# Patient Record
Sex: MALE
[De-identification: names, ages, dates, MRNs, and addresses within clinical notes are randomized per-mention and may not be internally consistent; named-entity substitution may affect disease eponyms.]

---

## 2023-02-06 ENCOUNTER — APPOINTMENT (OUTPATIENT)
Dept: PODIATRY | Facility: CLINIC | Age: 28
End: 2023-02-06
Payer: COMMERCIAL

## 2023-02-06 VITALS — HEIGHT: 72 IN | WEIGHT: 235 LBS | BODY MASS INDEX: 31.83 KG/M2

## 2023-02-06 DIAGNOSIS — M25.572 PAIN IN LEFT ANKLE AND JOINTS OF LEFT FOOT: ICD-10-CM

## 2023-02-06 DIAGNOSIS — Z87.828 PERSONAL HISTORY OF OTHER (HEALED) PHYSICAL INJURY AND TRAUMA: ICD-10-CM

## 2023-02-06 DIAGNOSIS — Z78.9 OTHER SPECIFIED HEALTH STATUS: ICD-10-CM

## 2023-02-06 PROBLEM — Z00.00 ENCOUNTER FOR PREVENTIVE HEALTH EXAMINATION: Status: ACTIVE | Noted: 2023-02-06

## 2023-02-06 PROCEDURE — 99203 OFFICE O/P NEW LOW 30 MIN: CPT

## 2023-02-06 PROCEDURE — L4361: CPT | Mod: LT

## 2023-02-06 RX ORDER — MELOXICAM 15 MG/1
15 TABLET ORAL
Qty: 30 | Refills: 2 | Status: ACTIVE | COMMUNITY
Start: 2023-02-06 | End: 1900-01-01

## 2023-02-06 NOTE — HISTORY OF PRESENT ILLNESS
[de-identified] : PAIN IN ANKLE FROM WORKOUT.  STARTED LAST FEET.  HAD PAIN ALL WEEK BUT GOT SEVERE ON FRIDAY. \par PREVIOUS TX.-NSAID AND WRAPS.\par WALKING BUT SEVERE PAIN.  NO IMPROVEMENT. \par LIMPING QUITE A BIT NOW ALSO.

## 2023-02-06 NOTE — PHYSICAL EXAM
[Mild] : mild swelling of medial foot [NL 30)] : inversion 30 degrees [NL (40)] : MTP joint DF 40 degrees [NL (20)] : MTP joint PF 20 degrees [5___] : [facility] 5[unfilled]/5 [2+] : posterior tibialis pulse: 2+ [Normal] : saphenous nerve sensation normal [Left] : left tibia/fibula [There are no fractures, subluxations or dislocations. No significant abnormalities are seen] : There are no fractures, subluxations or dislocations. No significant abnormalities are seen [] : no gross deformity [de-identified] : SOFT TISSUE SWELLING.

## 2023-02-06 NOTE — ASSESSMENT
[FreeTextEntry1] : PT ORDERED X 6 WEEKS\par CAM WALKER TO BE USED\par RX: MELOXICAM 15 MG # 30 t PO QD \par RTO 4 WEEKS \par LABS-URIC ACID

## 2023-02-07 ENCOUNTER — FORM ENCOUNTER (OUTPATIENT)
Age: 28
End: 2023-02-07